# Patient Record
Sex: FEMALE
[De-identification: names, ages, dates, MRNs, and addresses within clinical notes are randomized per-mention and may not be internally consistent; named-entity substitution may affect disease eponyms.]

---

## 2018-09-17 ENCOUNTER — HOSPITAL ENCOUNTER (EMERGENCY)
Dept: HOSPITAL 97 - ER | Age: 23
Discharge: HOME | End: 2018-09-17
Payer: SELF-PAY

## 2018-09-17 VITALS — SYSTOLIC BLOOD PRESSURE: 112 MMHG | DIASTOLIC BLOOD PRESSURE: 68 MMHG

## 2018-09-17 VITALS — OXYGEN SATURATION: 97 % | TEMPERATURE: 99.8 F

## 2018-09-17 DIAGNOSIS — N10: Primary | ICD-10-CM

## 2018-09-17 LAB
BUN BLD-MCNC: 9 MG/DL (ref 7–18)
GLUCOSE SERPLBLD-MCNC: 179 MG/DL (ref 74–106)
HCT VFR BLD CALC: 41.7 % (ref 36–45)
LYMPHOCYTES # SPEC AUTO: 1.1 K/UL (ref 0.7–4.9)
MCH RBC QN AUTO: 30.3 PG (ref 27–35)
MCV RBC: 84.7 FL (ref 80–100)
PMV BLD: 9.9 FL (ref 7.6–11.3)
POTASSIUM SERPL-SCNC: 3.3 MMOL/L (ref 3.5–5.1)
RBC # BLD: 4.92 M/UL (ref 3.86–4.86)
UA COMPLETE W REFLEX CULTURE PNL UR: (no result)

## 2018-09-17 PROCEDURE — 81003 URINALYSIS AUTO W/O SCOPE: CPT

## 2018-09-17 PROCEDURE — 36415 COLL VENOUS BLD VENIPUNCTURE: CPT

## 2018-09-17 PROCEDURE — 81015 MICROSCOPIC EXAM OF URINE: CPT

## 2018-09-17 PROCEDURE — 87088 URINE BACTERIA CULTURE: CPT

## 2018-09-17 PROCEDURE — 74177 CT ABD & PELVIS W/CONTRAST: CPT

## 2018-09-17 PROCEDURE — 96361 HYDRATE IV INFUSION ADD-ON: CPT

## 2018-09-17 PROCEDURE — 85025 COMPLETE CBC W/AUTO DIFF WBC: CPT

## 2018-09-17 PROCEDURE — 87077 CULTURE AEROBIC IDENTIFY: CPT

## 2018-09-17 PROCEDURE — 96365 THER/PROPH/DIAG IV INF INIT: CPT

## 2018-09-17 PROCEDURE — 87086 URINE CULTURE/COLONY COUNT: CPT

## 2018-09-17 PROCEDURE — 81025 URINE PREGNANCY TEST: CPT

## 2018-09-17 PROCEDURE — 96375 TX/PRO/DX INJ NEW DRUG ADDON: CPT

## 2018-09-17 PROCEDURE — 87186 SC STD MICRODIL/AGAR DIL: CPT

## 2018-09-17 PROCEDURE — 99284 EMERGENCY DEPT VISIT MOD MDM: CPT

## 2018-09-17 PROCEDURE — 80048 BASIC METABOLIC PNL TOTAL CA: CPT

## 2018-09-17 NOTE — ER
Nurse's Notes                                                                                     

 [facility]                                                                

Name: Nelly Boyce                                                                            

Age: 23 yrs                                                                                       

Sex: Female                                                                                       

: 1995                                                                                   

MRN: [id number]                                                                                   

Arrival Date: 2018                                                                          

Time: 13:06                                                                                       

Account#: [id number]                                                                            

Bed 16                                                                                            

Private MD: None, None                                                                            

Diagnosis: Acute tubulo-interstitial nephritis-bilateral                                          

                                                                                                  

Presentation:                                                                                     

                                                                                             

13:36 Presenting complaint: Patient states: vomiting, headache, abd pain, left back pain,     iw  

      fever since Saturday, pain in stomach when she takes deep breath, also has pain with        

      urination. Transition of care: patient was not received from another setting of care.       

      Onset of symptoms was September 15, 2018. Risk Assessment: Do you want to hurt yourself     

      or someone else? Patient reports no desire to harm self or others. Initial Sepsis           

      Screen: Does the patient meet any 2 criteria? Temp <36.0*C (96.8*F)) or > 38.3*C            

      (100.4*F). HR > 90 bpm. Does the patient have a suspected source of infection? Yes:         

      Dysuria/Frequency/Urgency/UTI. Care prior to arrival: None.                                 

13:36 Method Of Arrival: Ambulatory                                                           iw  

13:36 Acuity: JANNIE 3                                                                           iw  

                                                                                                  

OB/GYN:                                                                                           

13:39 LMP N/A - Birth control method                                                          iw  

                                                                                                  

Historical:                                                                                       

- Allergies:                                                                                      

13:39 No Known Allergies;                                                                     iw  

- Home Meds:                                                                                      

13:39 None [Active];                                                                          iw  

- PMHx:                                                                                           

13:39 None;                                                                                   iw  

- PSHx:                                                                                           

13:39 Cholecystectomy;                                                                        iw  

13:39 ;                                                                              iw  

                                                                                                  

- Immunization history:: Adult Immunizations not up to date.                                      

- Social history:: Smoking status: Patient/guardian denies using tobacco.                         

- Ebola Screening: : Patient negative for fever greater than or equal to 101.5 degrees            

  Fahrenheit, and additional compatible Ebola Virus Disease symptoms Patient denies               

  exposure to infectious person Patient denies travel to an Ebola-affected area in the            

  21 days before illness onset No symptoms or risks identified at this time.                      

                                                                                                  

                                                                                                  

Screenin:00 Abuse screen: Denies threats or abuse. Denies injuries from another. Nutritional        kr2 

      screening: No deficits noted. Tuberculosis screening: No symptoms or risk factors           

      identified. Fall Risk None identified.                                                      

                                                                                                  

Assessment:                                                                                       

14:00 General: Appears in no apparent distress. uncomfortable, well groomed, well developed,  kr2 

      well nourished, Behavior is calm, cooperative, appropriate for age. Pain: Complains of      

      pain in head, lower back, lower abdomen Pain currently is 8 out of 10 on a pain scale.      

      Quality of pain is described as aching, tender, Is continuous, Alleviated by nothing.       

      Neuro: Level of Consciousness is awake, alert, obeys commands, Oriented to person,          

      place, time, situation. Cardiovascular: Capillary refill < 3 seconds in bilateral           

      fingers. Respiratory: Airway is patent. GI: Abdomen is flat, non-distended, Bowel           

      sounds present X 4 quads. Reports nausea, vomiting. : Urine is clear. EENT: Oral          

      mucosa is moist. Derm: Skin is intact, is healthy with good turgor, Skin is clammy,         

      Skin is pale, Skin temperature is warm. Musculoskeletal: Circulation, motion, and           

      sensation intact.                                                                           

15:00 Reassessment: Patient appears in no apparent distress at this time. Patient and/or      kr2 

      family updated on plan of care and expected duration. Pain level reassessed. Patient is     

      alert, oriented x 3, equal unlabored respirations, skin warm/dry/pink. Patient states       

      feeling better.                                                                             

16:08 Reassessment: Patient appears in no apparent distress at this time. Patient and/or      kr2 

      family updated on plan of care and expected duration. Pain level reassessed. Patient is     

      alert, oriented x 3, equal unlabored respirations, skin warm/dry/pink. Patient states       

      feeling better.                                                                             

                                                                                                  

Vital Signs:                                                                                      

13:39  / 81; Pulse 150; Resp 16 S; Temp 103.3(O); Pulse Ox 99% on R/A; Weight 79.38 kg; iw  

      Height 5 ft. 4 in. (162.56 cm); Pain 6/10;                                                  

15:29  / 70; Pulse 106; Resp 16; Temp 99.8; Pulse Ox 97% ;                              kr2 

16:11  / 68; Pulse 104; Resp 16; Pulse Ox 97% ;                                         kr2 

13:39 Body Mass Index 30.04 (79.38 kg, 162.56 cm)                                             iw  

                                                                                                  

ED Course:                                                                                        

13:06 Patient arrived in ED.                                                                  sb2 

13:06 None, None is Private Physician.                                                        sb2 

13:16 [facility]                                                        kb  

13:16 Clayotn Craft MD is Attending Physician.                                             kb  

13:38 Triage completed.                                                                       iw  

13:39 Arm band placed on.                                                                     iw  

13:57 Frances Sarabia, RN is Primary Nurse.                                                     kr2 

14:00 Patient has correct armband on for positive identification. Bed in low position. Call   kr2 

      light in reach. Side rails up X 1. Side rails up X2. Pulse ox on. NIBP on. Door closed.     

      Head of bed elevated.                                                                       

14:05 Inserted saline lock: 20 gauge in left antecubital area, using aseptic technique. Blood kr2 

      collected.                                                                                  

15:53 CT completed. Patient tolerated procedure well. Patient moved to CT via stretcher.        

      Patient moved back from CT.                                                                 

15:54 CT Abd/Pelvis - W/Contrast In Process Unspecified.                                      EDMS

16:44 No provider procedures requiring assistance completed. IV discontinued, intact,         kr2 

      bleeding controlled, No redness/swelling at site. Pressure dressing applied.                

                                                                                                  

Administered Medications:                                                                         

14:05 Drug: Tylenol 650 mg Route: PO;                                                         kr2 

16:15 Follow up: Response: No adverse reaction; Temperature is decreased                      kr2 

14:10 Drug: NS 0.9% 1000 ml Route: IV; Rate: 1000 ml; Site: left antecubital;                 kr2 

16:16 Follow up: Response: No adverse reaction; IV Status: Completed infusion                 kr2 

14:10 Drug: Zofran 4 mg Route: IVP; Site: left antecubital;                                   kr2 

14:30 Follow up: Response: No adverse reaction; Nausea is decreased                           kr2 

14:12 Drug: Rocephin - (cefTRIAXone) 1 grams Route: IVPB; Infused Over: 30 mins; Site: left   kr2 

      antecubital;                                                                                

14:45 Follow up: Response: No adverse reaction; IV Status: Completed infusion                 kr2 

16:10 Drug: Potassium Chloride 20 mEq Route: PO;                                              kr2 

16:44 Follow up: Response: No adverse reaction                                                kr2 

                                                                                                  

                                                                                                  

Outcome:                                                                                          

16:28 Discharge ordered by MD.                                                                kb  

16:45 Discharged to home ambulatory, with family.                                             kr2 

16:45 Condition: good                                                                             

16:45 Discharge instructions given to patient, family, Instructed on discharge instructions,      

      follow up and referral plans. medication usage, Demonstrated understanding of               

      instructions, follow-up care, medications, Prescriptions given X 2.                         

16:46 Patient left the ED.                                                                    kr2 

                                                                                                  

Signatures:                                                                                       

Dispatcher [employer]                           EDMS                                                 

Khalida Fontanez, SLAVA NICOLE-CkJerel Farah Irene, RN                     RN   iw                                                   

Frances Sarabia RN                       RN   kr2                                                  

Jessica Weathers2                                                  

                                                                                                  

**************************************************************************************************

## 2018-09-17 NOTE — RAD REPORT
EXAM DESCRIPTION:  CTAbdomen   Pelvis W Contrast - 9/17/2018 3:54 pm

 

CLINICAL HISTORY:  Abdominal pain.

Iv contrast only;Abd pain

 

COMPARISON:  No comparisons

 

TECHNIQUE:  Biphasic CT imaging of the abdomen and pelvis was performed with 100 ml non-ionic IV cont
rast.

 

All CT scans are performed using dose optimization technique as appropriate and may include automated
 exposure control or mA/KV adjustment according to patient size.

 

FINDINGS:  The lung bases are clear.

 

The liver, spleen, pancreas, adrenal glands are within normal limits. Cholecystectomy clips. Both kid
neys demonstrate striated nephrograms compatible with bilateral pyelonephritis. No perinephric absces
s seen.

 

No bowel obstruction, free air, free fluid or abscess.  The appendix is not identified as a discrete 
structure, however, no secondary findings of appendicitis are identified.   No evidence of significan
t lymphadenopathy.

 

No suspicious bony findings.

 

IMPRESSION:  Bilateral pyelonephritis without abscess.

## 2018-09-17 NOTE — EDPHYS
Physician Documentation                                                                           

 [facility]                                                                

Name: Nelly Boyce                                                                            

Age: 23 yrs                                                                                       

Sex: Female                                                                                       

: 1995                                                                                   

MRN: [id number]                                                                                   

Arrival Date: 2018                                                                          

Time: 13:06                                                                                       

Account#: [id number]                                                                            

Bed 16                                                                                            

Private MD: None, None                                                                            

ED Physician Clayton Craft                                                                      

HPI:                                                                                              

                                                                                             

14:17 This 23 yrs old  Female presents to ER via Ambulatory with complaints of        kb  

      Vomiting, Headache, Fever.                                                                  

14:17 The patient has not experienced similar symptoms in the past. The patient has not       kb  

      recently seen a physician.                                                                  

14:17 The patient presents with abdominal pain. Onset: The symptoms/episode began/occurred    kb  

      yesterday. The symptoms do not radiate. Associated signs and symptoms: Pertinent            

      positives: nausea and vomiting. The symptoms are described as constant. Modifying           

      factors: The symptoms are alleviated by nothing, the symptoms are aggravated by             

      nothing. Severity of pain: At its worst the pain was moderate in the emergency              

      department the pain is unchanged.                                                           

                                                                                                  

OB/GYN:                                                                                           

13:39 LMP N/A - Birth control method                                                          iw  

                                                                                                  

Historical:                                                                                       

- Allergies:                                                                                      

13:39 No Known Allergies;                                                                     iw  

- Home Meds:                                                                                      

13:39 None [Active];                                                                          iw  

- PMHx:                                                                                           

13:39 None;                                                                                   iw  

- PSHx:                                                                                           

13:39 Cholecystectomy;                                                                        iw  

13:39 ;                                                                              iw  

                                                                                                  

- Immunization history:: Adult Immunizations not up to date.                                      

- Social history:: Smoking status: Patient/guardian denies using tobacco.                         

- Ebola Screening: : Patient negative for fever greater than or equal to 101.5 degrees            

  Fahrenheit, and additional compatible Ebola Virus Disease symptoms Patient denies               

  exposure to infectious person Patient denies travel to an Ebola-affected area in the            

  21 days before illness onset No symptoms or risks identified at this time.                      

                                                                                                  

                                                                                                  

ROS:                                                                                              

14:15 ENT: Negative for injury, pain, and discharge, Cardiovascular: Negative for chest pain, kb  

      palpitations, and edema, Respiratory: Negative for shortness of breath, cough,              

      wheezing, and pleuritic chest pain, : Negative for injury, bleeding, discharge, and       

      swelling, MS/Extremity: Negative for injury and deformity, Skin: Negative for injury,       

      rash, and discoloration.                                                                    

14:15 Constitutional: Positive for fever, malaise, Negative for body aches, chills, fatigue,      

      poor PO intake, weight loss.                                                                

14:15 Abdomen/GI: Positive for abdominal pain, nausea and vomiting, Negative for diarrhea,        

      constipation, abdominal cramps, abdominal distension, anorexia.                             

14:15 Neuro: Positive for headache, Negative for altered mental status, dizziness.                

                                                                                                  

Exam:                                                                                             

14:16 Constitutional:  This is a well developed, well nourished patient who is awake, alert,  kb  

      and in no acute distress. Head/Face:  Normocephalic, atraumatic. Chest/axilla:  Normal      

      chest wall appearance and motion.  Nontender with no deformity.  No lesions are             

      appreciated. Cardiovascular:  Regular rate and rhythm with a normal S1 and S2.  No          

      gallops, murmurs, or rubs.  Normal PMI, no JVD.  No pulse deficits. Respiratory:  Lungs     

      have equal breath sounds bilaterally, clear to auscultation and percussion.  No rales,      

      rhonchi or wheezes noted.  No increased work of breathing, no retractions or nasal          

      flaring. Abdomen/GI:  Soft, non-tender, with normal bowel sounds.  No distension or         

      tympany.  No guarding or rebound.  No evidence of tenderness throughout. Skin:  Warm,       

      dry with normal turgor.  Normal color with no rashes, no lesions, and no evidence of        

      cellulitis. MS/ Extremity:  Pulses equal, no cyanosis.  Neurovascular intact.  Full,        

      normal range of motion. Neuro:  Awake and alert, GCS 15, oriented to person, place,         

      time, and situation.  Cranial nerves II-XII grossly intact.  Motor strength 5/5 in all      

      extremities.  Sensory grossly intact.  Cerebellar exam normal.  Normal gait.                

14:16 Back: CVA tenderness, that is mild, is noted bilaterally.                                   

                                                                                                  

Vital Signs:                                                                                      

13:39  / 81; Pulse 150; Resp 16 S; Temp 103.3(O); Pulse Ox 99% on R/A; Weight 79.38 kg; iw  

      Height 5 ft. 4 in. (162.56 cm); Pain 6/10;                                                  

15:29  / 70; Pulse 106; Resp 16; Temp 99.8; Pulse Ox 97% ;                              kr2 

16:11  / 68; Pulse 104; Resp 16; Pulse Ox 97% ;                                         kr2 

13:39 Body Mass Index 30.04 (79.38 kg, 162.56 cm)                                             iw  

                                                                                                  

MDM:                                                                                              

13:40 Patient medically screened.                                                             kb  

14:17 Data reviewed: vital signs, nurses notes. Data interpreted: Pulse oximetry: on room air kb  

      is 99 %. Interpretation: normal.                                                            

16:27 Counseling: I had a detailed discussion with the patient and/or guardian regarding: the kb  

      historical points, exam findings, and any diagnostic results supporting the                 

      discharge/admit diagnosis, lab results, radiology results, the need for outpatient          

      follow up, a family practitioner, to return to the emergency department if symptoms         

      worsen or persist or if there are any questions or concerns that arise at home.             

                                                                                                  

                                                                                             

13:52 Order name: Basic Metabolic Panel; Complete Time: 14:49                                 kb  

                                                                                             

13:52 Order name: CBC with Diff; Complete Time: 15:35                                         kb  

                                                                                             

13:52 Order name: Urine Microscopic Only; Complete Time: 14:33                                kb  

                                                                                             

13:52 Order name: Urine Culture                                                               kb  

                                                                                             

13:55 Order name: Urine Dipstick--Ancillary (enter results)                                   bd  

                                                                                             

13:55 Order name: Urine Pregnancy--Ancillary (enter results)                                  bd  

                                                                                             

13:56 Order name: Urine Dipstick-Ancillary; Complete Time: 13:59                              EDMS

                                                                                             

13:56 Order name: Urine Pregnancy--Ancillary; Complete Time: 13:59                            EDMS

                                                                                             

15:36 Order name: CT Abd/Pelvis - W/Contrast; Complete Time: 16:13                            kb  

                                                                                             

13:52 Order name: Urine Pregnancy Test (obtain specimen); Complete Time: 14:25                kb  

                                                                                             

13:52 Order name: IV Saline Lock; Complete Time: 14:25                                        kb  

                                                                                             

13:52 Order name: Labs collected and sent; Complete Time: 14:24                               kb  

                                                                                             

13:52 Order name: Urine Dipstick-Ancillary (obtain specimen); Complete Time: 14:24            kb  

                                                                                             

14:59 Order name: Vital Signs; Complete Time: 15:30                                           kb  

                                                                                                  

Administered Medications:                                                                         

14:05 Drug: Tylenol 650 mg Route: PO;                                                         kr2 

16:15 Follow up: Response: No adverse reaction; Temperature is decreased                      kr2 

14:10 Drug: NS 0.9% 1000 ml Route: IV; Rate: 1000 ml; Site: left antecubital;                 kr2 

16:16 Follow up: Response: No adverse reaction; IV Status: Completed infusion                 kr2 

14:10 Drug: Zofran 4 mg Route: IVP; Site: left antecubital;                                   kr2 

14:30 Follow up: Response: No adverse reaction; Nausea is decreased                           kr2 

14:12 Drug: Rocephin - (cefTRIAXone) 1 grams Route: IVPB; Infused Over: 30 mins; Site: left   kr2 

      antecubital;                                                                                

14:45 Follow up: Response: No adverse reaction; IV Status: Completed infusion                 kr2 

16:10 Drug: Potassium Chloride 20 mEq Route: PO;                                              kr2 

16:44 Follow up: Response: No adverse reaction                                                kr2 

                                                                                                  

                                                                                                  

Disposition:                                                                                      

                                                                                             

07:24 Co-signature as Attending Physician, Clayton Craft MD I agree with the assessment and  florian 

      plan of care.                                                                               

                                                                                                  

Disposition:                                                                                      

18 16:28 Discharged to Home. Impression: Acute tubulo-interstitial nephritis - bilateral.   

- Condition is Stable.                                                                            

- Discharge Instructions: Pyelonephritis, Adult, Easy-to-Read.                                    

- Prescriptions for cefpodoxime 200 mg Oral Tablet - take 1 tablet by ORAL route every            

  12 hours for 7 days with food; 14 tablet. Macrobid 100 mg Oral Capsule - take 1                 

  capsule by ORAL route every 12 hours for 7 days; 14 capsule.                                    

- Medication Reconciliation Form, Thank You Letter, Antibiotic Education, Prescription            

  Opioid Use, Work release form form.                                                             

- Follow up: Emergency Department; When: As needed; Reason: Worsening of condition.               

  Follow up: Private Physician; When: 2 - 3 days; Reason: Recheck today's complaints,             

  Continuance of care, Re-evaluation by your physician.                                           

                                                                                                  

                                                                                                  

                                                                                                  

Signatures:                                                                                       

Dispatcher [employer]                           EDMS                                                 

Khalida Fontanez, FNP-C                 BONNIE-Clayton Baumann MD MD cha Williams, Irene, RN RN iw Reaves, Karey, RN                       RN   kr2                                                  

                                                                                                  

Corrections: (The following items were deleted from the chart)                                    

                                                                                             

16:45 16:28 2018 16:28 Discharged to Home. Impression: Acute tubulo-interstitial        kr2 

      nephritis - bilateral. Condition is Stable. Forms are Medication Reconciliation Form,       

      Thank You Letter, Antibiotic Education, Prescription Opioid Use. Follow up: Emergency       

      Department; When: As needed; Reason: Worsening of condition. Follow up: Private             

      Physician; When: 2 - 3 days; Reason: Recheck today's complaints, Continuance of care,       

      Re-evaluation by your physician. kb                                                         

                                                                                                  

**************************************************************************************************

## 2019-10-28 ENCOUNTER — HOSPITAL ENCOUNTER (EMERGENCY)
Dept: HOSPITAL 97 - ER | Age: 24
Discharge: HOME | End: 2019-10-28
Payer: SELF-PAY

## 2019-10-28 VITALS — DIASTOLIC BLOOD PRESSURE: 89 MMHG | TEMPERATURE: 99 F | SYSTOLIC BLOOD PRESSURE: 131 MMHG | OXYGEN SATURATION: 100 %

## 2019-10-28 DIAGNOSIS — L05.01: Primary | ICD-10-CM

## 2019-10-28 PROCEDURE — 81003 URINALYSIS AUTO W/O SCOPE: CPT

## 2019-10-28 PROCEDURE — 81025 URINE PREGNANCY TEST: CPT

## 2019-10-28 PROCEDURE — 99283 EMERGENCY DEPT VISIT LOW MDM: CPT

## 2019-10-28 NOTE — EDPHYS
Physician Documentation                                                                           

 [facility]                                                                 

Name: Nelly Boyce                                                                            

Age: 24 yrs                                                                                       

Sex: Female                                                                                       

: 1995                                                                                   

MRN: [id number]                                                                                   

Arrival Date: 10/28/2019                                                                          

Time: 13:12                                                                                       

Account#: [id number]                                                                            

Bed 28                                                                                            

Private MD: None, None                                                                            

ED Physician Clayton Craft                                                                      

HPI:                                                                                              

10/28                                                                                             

15:02 This 24 yrs old  Female presents to ER via Ambulatory with complaints of Cyst.  florian 

15:02 The patient presents with an abscess of the coccyx. Description: The affected area is   florian 

      small, moderate sized. Onset: The symptoms/episode began/occurred 5 day(s) ago.             

      Possible cause(s): unknown. Associated signs and symptoms: The patient has no apparent      

      associated signs or symptoms. The patient has not experienced similar symptoms in the       

      past.                                                                                       

                                                                                                  

OB/GYN:                                                                                           

13:28 LMP N/A - implantted bc                                                                 tw2 

                                                                                                  

Historical:                                                                                       

- Allergies:                                                                                      

13:30 No Known Allergies;                                                                     tw2 

- Home Meds:                                                                                      

13:30 None [Active];                                                                          tw2 

- PMHx:                                                                                           

13:30 None;                                                                                   tw2 

- PSHx:                                                                                           

13:30 Cholecystectomy; ;                                                             tw2 

                                                                                                  

- Immunization history:: Adult Immunizations.                                                     

- Social history:: Smoking status: .                                                              

- Ebola Screening: : Patient denies travel to an Ebola-affected area in the 21 days               

  before illness onset.                                                                           

- Family history:: not pertinent.                                                                 

                                                                                                  

                                                                                                  

ROS:                                                                                              

15:02 Constitutional: Negative for fever, chills, and weight loss, Eyes: Negative for injury, florian 

      pain, redness, and discharge, ENT: Negative for injury, pain, and discharge, Neck:          

      Negative for injury, pain, and swelling, Cardiovascular: Negative for chest pain,           

      palpitations, and edema, Respiratory: Negative for shortness of breath, cough,              

      wheezing, and pleuritic chest pain, Abdomen/GI: Negative for abdominal pain, nausea,        

      vomiting, diarrhea, and constipation, Back: Negative for injury and pain, : Negative      

      for injury, bleeding, discharge, and swelling, MS/Extremity: Negative for injury and        

      deformity, Neuro: Negative for headache, weakness, numbness, tingling, and seizure,         

      Psych: Negative for depression, anxiety, suicide ideation, homicidal ideation, and          

      hallucinations, Allergy/Immunology: Negative for hives, rash, and allergies, Endocrine:     

      Negative for neck swelling, polydipsia, polyuria, polyphagia, and marked weight             

      changes, Hematologic/Lymphatic: Negative for swollen nodes, abnormal bleeding, and          

      unusual bruising.                                                                           

15:02 Skin: Positive for abscess, swelling, of the coccyx.                                        

                                                                                                  

Exam:                                                                                             

15:02 Constitutional:  This is a well developed, well nourished patient who is awake, alert,  florian 

      and in no acute distress. Head/Face:  Normocephalic, atraumatic. Eyes:  Pupils equal        

      round and reactive to light, extra-ocular motions intact.  Lids and lashes normal.          

      Conjunctiva and sclera are non-icteric and not injected.  Cornea within normal limits.      

      Periorbital areas with no swelling, redness, or edema. ENT:  Nares patent. No nasal         

      discharge, no septal abnormalities noted.  Tympanic membranes are normal and external       

      auditory canals are clear.  Oropharynx with no redness, swelling, or masses, exudates,      

      or evidence of obstruction, uvula midline.  Mucous membranes moist. Neck:  Trachea          

      midline, no thyromegaly or masses palpated, and no cervical lymphadenopathy.  Supple,       

      full range of motion without nuchal rigidity, or vertebral point tenderness.  No            

      Meningismus. Chest/axilla:  Normal chest wall appearance and motion.  Nontender with no     

      deformity.  No lesions are appreciated. Cardiovascular:  Regular rate and rhythm with a     

      normal S1 and S2.  No gallops, murmurs, or rubs.  Normal PMI, no JVD.  No pulse             

      deficits. Respiratory:  Lungs have equal breath sounds bilaterally, clear to                

      auscultation and percussion.  No rales, rhonchi or wheezes noted.  No increased work of     

      breathing, no retractions or nasal flaring. Abdomen/GI:  Soft, non-tender, with normal      

      bowel sounds.  No distension or tympany.  No guarding or rebound.  No evidence of           

      tenderness throughout. Back:  No spinal tenderness.  No costovertebral tenderness.          

      Full range of motion. Pelvic Exam:  Normal external genitalia.  Speculum exam with          

      closed cervical os, no discharge or bleeding noted.  Bimanual exam with normal adnexa,      

      no adnexal or cervical motion tenderness.  Normal uterus. MS/ Extremity:  Pulses equal,     

      no cyanosis.  Neurovascular intact.  Full, normal range of motion. Neuro:  Awake and        

      alert, GCS 15, oriented to person, place, time, and situation.  Cranial nerves II-XII       

      grossly intact.  Motor strength 5/5 in all extremities.  Sensory grossly intact.            

      Cerebellar exam normal.  Normal gait. Psych:  Awake, alert, with orientation to person,     

      place and time.  Behavior, mood, and affect are within normal limits.                       

15:02 Skin: abscess, that is small, that is moderate sized, of the coccyx, cellulitis, that       

      is mild, that is moderate, induration, that is mild is noted, that is moderate is noted.    

                                                                                                  

Vital Signs:                                                                                      

13:28  / 89; Pulse 115; Resp 19; Temp 99.0(O); Pulse Ox 100% on R/A; Weight 85.91 kg    tw2 

      (M); Height 5 ft. 4 in. (162.56 cm); Pain 9/10;                                             

13:28 Body Mass Index 32.51 (85.91 kg, 162.56 cm)                                             tw2 

                                                                                                  

Procedures:                                                                                       

15:04 I \T\ D: Incision and drainage was performed for an abscess of the pilonidal cyst Prepped florian

      with Betadine, Anesthetized with 10 ml's 1% Lidocaine w/ Epi. Incised with #11 blade.       

      Drained moderate amount Packed with iodoform gauze, Dressing: non-Adherent dressing,        

      the patient tolerated the procedure well.                                                   

                                                                                                  

MDM:                                                                                              

14:15 Patient medically screened.                                                             [facility] 

15:04 Data reviewed: vital signs, nurses notes.                                               [facility] 

                                                                                                  

10/28                                                                                             

15:01 Order name: Urine Dipstick-Ancillary (obtain specimen); Complete Time: 16:00            [facility] 

10/28                                                                                             

15:01 Order name: Urine Pregnancy Test (obtain specimen); Complete Time: 16:00                [facility] 

10/28                                                                                             

15:01 Order name: Dressing - Wound; Complete Time: 16:00                                      [facility] 

10/28                                                                                             

15:01 Order name: Gloves, Sterile; Complete Time: 15:07                                       [facility] 

10/28                                                                                             

15:01 Order name: Setup Suture Tray; Complete Time: 15:08                                     [facility] 

                                                                                                  

Administered Medications:                                                                         

15:32 Drug: Lidocaine-Epinephrine -1%: (1:100,000) 10 ml Volume: 20 ml; Route: Infiltration;  tr5 

                                                                                                  

                                                                                                  

Disposition:                                                                                      

10/28/19 15:06 Discharged to Home. Impression: Pilonidal cyst with abscess.                       

- Condition is Stable.                                                                            

- Discharge Instructions: Skin Abscess, Incision and Drainage, Pilonidal Cyst, Skin               

  Abscess, Easy-to-Read, Incision and Drainage of a Pilonidal Cyst, Care After,                   

  Incision and Drainage, Care After, Incision and Drainage of a Pilonidal Cyst.                   

- Prescriptions for Tylenol- Codeine #3 300-30 mg Oral Tablet - take 2 tablet by ORAL             

  route every 6 hours As needed; 30 tablet. Doxycycline Hyclate 100 mg Oral Tablet -              

  take 1 tablet by ORAL route every 12 hours; 20 tablet. Bactrim - 160 mg Oral              

  Tablet - take 1 tablet by ORAL route every 12 hours for 7 days; 14 tablet.                      

- Medication Reconciliation Form, Thank You Letter, Antibiotic Education, Prescription            

  Opioid Use, Work release form form.                                                             

- Follow up: Private Physician; When: 5 - 6 days; Reason: Recheck today's complaints,             

  Continuance of care, Re-evaluation by your physician. Follow up: Manuel Salazar MD;              

  When: 2 - 3 days; Reason: Recheck today's complaints, Re-evaluation by your physician.          

- Problem is new.                                                                                 

- Symptoms have improved.                                                                         

                                                                                                  

                                                                                                  

                                                                                                  

Signatures:                                                                                       

Clayton Craft MD MD cha Wise, Tara RN                          RN   tw2                                                  

Donavan Wells RN                   RN   tr5                                                  

                                                                                                  

Corrections: (The following items were deleted from the chart)                                    

16:05 15:06 10/28/2019 15:06 Discharged to Home. Impression: Pilonidal cyst with abscess.     tr5 

      Condition is Stable. Forms are Medication Reconciliation Form, Thank You Letter,            

      Antibiotic Education, Prescription Opioid Use. Follow up: Private Physician; When: 5 -      

      6 days; Reason: Recheck today's complaints, Continuance of care, Re-evaluation by your      

      physician. Follow up: Manuel Salazar; When: 2 - 3 days; Reason: Recheck today's                

      complaints, Re-evaluation by your physician. Problem is new. Symptoms have improved. florian    

                                                                                                  

**************************************************************************************************

## 2019-10-28 NOTE — ER
Nurse's Notes                                                                                     

 [facility] – [place], [place]                                                                 

Name: Nelly Boyce                                                                            

Age: 24 yrs                                                                                       

Sex: Female                                                                                       

: 1995                                                                                   

MRN: [id number]                                                                                   

Arrival Date: 10/28/2019                                                                          

Time: 13:12                                                                                       

Account#: [id number]                                                                            

Bed 28                                                                                            

Private MD: None, None                                                                            

Diagnosis: Pilonidal cyst with abscess                                                            

                                                                                                  

Presentation:                                                                                     

10/28                                                                                             

13:27 Presenting complaint: Patient states: started Friday i think i have an abscess on the   tw2 

      top of my butt and its red and swollen. Transition of care: patient was not received        

      from another setting of care. Onset of symptoms was 2019. Risk Assessment:      

      Do you want to hurt yourself or someone else? Patient reports no desire to harm self or     

      others. Initial Sepsis Screen: Does the patient meet any 2 criteria? HR > 90 bpm. No.       

      Patient's initial sepsis screen is negative. Does the patient have a suspected source       

      of infection? Yes: Skin breakdown/wound. Care prior to arrival: None.                       

13:27 Method Of Arrival: Ambulatory                                                           tw2 

13:27 Acuity: JANNIE 4                                                                           tw2 

                                                                                                  

Triage Assessment:                                                                                

13:30 General: Appears uncomfortable, Behavior is calm, cooperative, appropriate for age.     tw2 

      Pain: Complains of pain in coccyx.                                                          

                                                                                                  

OB/GYN:                                                                                           

13:28 LMP N/A - implantted bc                                                                 tw2 

                                                                                                  

Historical:                                                                                       

- Allergies:                                                                                      

13:30 No Known Allergies;                                                                     tw2 

- Home Meds:                                                                                      

13:30 None [Active];                                                                          tw2 

- PMHx:                                                                                           

13:30 None;                                                                                   tw2 

- PSHx:                                                                                           

13:30 Cholecystectomy; ;                                                             tw2 

                                                                                                  

- Immunization history:: Adult Immunizations.                                                     

- Social history:: Smoking status: .                                                              

- Ebola Screening: : Patient denies travel to an Ebola-affected area in the 21 days               

  before illness onset.                                                                           

- Family history:: not pertinent.                                                                 

                                                                                                  

                                                                                                  

Screenin:15 Abuse screen: Denies threats or abuse. Nutritional screening: No deficits noted.        tw2 

      Tuberculosis screening: No symptoms or risk factors identified. Fall Risk None              

      identified.                                                                                 

                                                                                                  

Assessment:                                                                                       

15:00 General: Appears uncomfortable, Behavior is calm, cooperative, appropriate for age.     tr5 

      Pain: Complains of pain in buttocks. Neuro: Level of Consciousness is awake, alert,         

      obeys commands, Oriented to person, place, time,  are equal bilaterally.               

      Cardiovascular: Heart tones present Capillary refill < 3 seconds Pulses are all             

      present. Edema is absent. Respiratory: Airway is patent Respiratory effort is even,         

      unlabored, Respiratory pattern is regular, symmetrical. GI: No signs and/or symptoms        

      were reported involving the gastrointestinal system. : No signs and/or symptoms were      

      reported regarding the genitourinary system. EENT: No signs and/or symptoms were            

      reported regarding the EENT system. Derm: Abscess located on buttocks. Musculoskeletal:     

      No signs and/or symptoms reported regarding the musculoskeletal system.                     

                                                                                                  

Vital Signs:                                                                                      

13:28  / 89; Pulse 115; Resp 19; Temp 99.0(O); Pulse Ox 100% on R/A; Weight 85.91 kg    tw2 

      (M); Height 5 ft. 4 in. (162.56 cm); Pain 9/10;                                             

13:28 Body Mass Index 32.51 (85.91 kg, 162.56 cm)                                             tw2 

                                                                                                  

ED Course:                                                                                        

13:12 Patient arrived in ED.                                                                  mr  

13:12 None, None is Private Physician.                                                        mr  

13:28 Triage completed.                                                                       tw2 

13:28 Arm band placed on.                                                                     tw2 

14:10 Bed in low position. Call light in reach. Adult w/ patient.                             tw2 

14:15 Clayton Craft MD is Attending Physician.                                             [facility] 

14:22 Donavan Wells, RN is Primary Nurse.                                                 tr5 

15:00 Urine collected: clean catch specimen, clear.                                           tr5 

15:05 Manuel Salazar MD is Referral Physician.                                                 florian 

16:04 No provider procedures requiring assistance completed. Patient did not have IV access   tr5 

      during this emergency room visit.                                                           

                                                                                                  

Administered Medications:                                                                         

15:32 Drug: Lidocaine-Epinephrine -1%: (1:100,000) 10 ml Volume: 20 ml; Route: Infiltration;  tr5 

                                                                                                  

                                                                                                  

Outcome:                                                                                          

15:06 Discharge ordered by MD.                                                                florian 

16:04 Discharged to home ambulatory.                                                          tr5 

16:04 Condition: stable                                                                           

16:04 Discharge instructions given to patient, family, Instructed on discharge instructions,      

      follow up and referral plans. medication usage, Demonstrated understanding of               

      instructions, follow-up care, medications, Prescriptions given X 3.                         

16:05 Patient left the ED.                                                                    tr5 

                                                                                                  

Signatures:                                                                                       

Clayton Craft MD MD cha Rivera, Mary                                 mr                                                   

Sheryl Teixeira, ZULY                          RN   tw2                                                  

Donavan Wells RN                   RN   tr5                                                  

                                                                                                  

**************************************************************************************************